# Patient Record
Sex: FEMALE | Race: WHITE | NOT HISPANIC OR LATINO | Employment: UNEMPLOYED | URBAN - NONMETROPOLITAN AREA
[De-identification: names, ages, dates, MRNs, and addresses within clinical notes are randomized per-mention and may not be internally consistent; named-entity substitution may affect disease eponyms.]

---
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
## 2024-02-22 DIAGNOSIS — Z30.8 ENCOUNTER FOR OTHER CONTRACEPTIVE MANAGEMENT: ICD-10-CM

## 2024-02-22 RX ORDER — NORETHINDRONE ACETATE AND ETHINYL ESTRADIOL, ETHINYL ESTRADIOL AND FERROUS FUMARATE 1MG-10(24)
1 KIT ORAL DAILY
Qty: 84 TABLET | Refills: 3 | Status: SHIPPED | OUTPATIENT
Start: 2024-02-22

## 2024-05-19 DIAGNOSIS — Z30.8 ENCOUNTER FOR OTHER CONTRACEPTIVE MANAGEMENT: ICD-10-CM

## 2024-05-20 RX ORDER — NORETHINDRONE ACETATE AND ETHINYL ESTRADIOL, ETHINYL ESTRADIOL AND FERROUS FUMARATE 1MG-10(24)
1 KIT ORAL DAILY
Qty: 84 TABLET | Refills: 3 | Status: SHIPPED | OUTPATIENT
Start: 2024-05-20

## 2024-07-09 DIAGNOSIS — Z30.8 ENCOUNTER FOR OTHER CONTRACEPTIVE MANAGEMENT: ICD-10-CM

## 2024-07-09 RX ORDER — NORETHINDRONE ACETATE AND ETHINYL ESTRADIOL, ETHINYL ESTRADIOL AND FERROUS FUMARATE 1MG-10(24)
1 KIT ORAL DAILY
Qty: 84 TABLET | Refills: 3 | OUTPATIENT
Start: 2024-07-09

## 2024-08-08 DIAGNOSIS — Z30.8 ENCOUNTER FOR OTHER CONTRACEPTIVE MANAGEMENT: ICD-10-CM

## 2024-08-09 RX ORDER — NORETHINDRONE ACETATE AND ETHINYL ESTRADIOL, ETHINYL ESTRADIOL AND FERROUS FUMARATE 1MG-10(24)
1 KIT ORAL DAILY
Qty: 84 TABLET | Refills: 0 | Status: SHIPPED | OUTPATIENT
Start: 2024-08-09

## 2024-10-30 DIAGNOSIS — Z30.8 ENCOUNTER FOR OTHER CONTRACEPTIVE MANAGEMENT: ICD-10-CM

## 2024-11-01 RX ORDER — NORETHINDRONE ACETATE AND ETHINYL ESTRADIOL, ETHINYL ESTRADIOL AND FERROUS FUMARATE 1MG-10(24)
1 KIT ORAL DAILY
Qty: 30 TABLET | Refills: 0 | Status: SHIPPED | OUTPATIENT
Start: 2024-11-01

## 2024-11-22 ENCOUNTER — OFFICE VISIT (OUTPATIENT)
Dept: OBSTETRICS AND GYNECOLOGY | Age: 19
End: 2024-11-22
Payer: COMMERCIAL

## 2024-11-22 VITALS
DIASTOLIC BLOOD PRESSURE: 72 MMHG | HEIGHT: 61 IN | BODY MASS INDEX: 22.09 KG/M2 | WEIGHT: 117 LBS | SYSTOLIC BLOOD PRESSURE: 121 MMHG

## 2024-11-22 DIAGNOSIS — Z30.8 ENCOUNTER FOR OTHER CONTRACEPTIVE MANAGEMENT: ICD-10-CM

## 2024-11-22 RX ORDER — ONDANSETRON 8 MG/1
TABLET, ORALLY DISINTEGRATING ORAL EVERY 24 HOURS
COMMUNITY

## 2024-11-22 RX ORDER — FLUTICASONE PROPIONATE 50 MCG
SPRAY, SUSPENSION (ML) NASAL EVERY 24 HOURS
COMMUNITY
Start: 2024-11-22

## 2024-11-22 RX ORDER — LORATADINE PSEUDOEPHEDRINE SULFATE 10; 240 MG/1; MG/1
TABLET, EXTENDED RELEASE ORAL EVERY 24 HOURS
COMMUNITY
Start: 2024-11-22

## 2024-11-22 RX ORDER — BUSPIRONE HYDROCHLORIDE 5 MG/1
TABLET ORAL EVERY 12 HOURS SCHEDULED
COMMUNITY

## 2024-11-22 RX ORDER — NORETHINDRONE ACETATE AND ETHINYL ESTRADIOL, ETHINYL ESTRADIOL AND FERROUS FUMARATE 1MG-10(24)
1 KIT ORAL DAILY
Qty: 30 TABLET | Refills: 11 | Status: SHIPPED | OUTPATIENT
Start: 2024-11-22

## 2024-11-22 NOTE — PROGRESS NOTES
"Chief Complaint   Patient presents with    Gynecologic Exam     Patient is here for annual well GYN Exam for refills on OCP.  Patient is currently on Loestrin and states its going good does c/o breakthrough bleeding every other month. Patient denies current pelvic pain, abnormal vaginal bleeding or discharge, dyspareunia, urinary incontinence, and voices no other complaints.        History:  Ana Padgett is a 19 y.o. female who presents today for follow-up for evaluation of the above:    HPI    Patient presents today for f/u OCP   Doing well at this time         ROS:  Review of Systems   Constitutional: Negative.    HENT: Negative.     Eyes: Negative.    Respiratory: Negative.     Cardiovascular: Negative.    Gastrointestinal: Negative.    Endocrine: Negative.    Genitourinary: Negative.    Musculoskeletal: Negative.    Skin: Negative.    Neurological: Negative.    Psychiatric/Behavioral: Negative.         Ms. Padgett  reports that she has never smoked. She has never used smokeless tobacco. She reports that she does not drink alcohol and does not use drugs.      Current Outpatient Medications:     busPIRone (BUSPAR) 5 MG tablet, Every 12 (Twelve) Hours., Disp: , Rfl:     fluticasone (FLONASE) 50 MCG/ACT nasal spray, Daily., Disp: , Rfl:     loratadine-pseudoephedrine (Claritin-D 24 Hour)  MG per 24 hr tablet, Daily., Disp: , Rfl:     Norethin-Eth Estrad-Fe Biphas (Lo Loestrin Fe) 1 MG-10 MCG / 10 MCG tablet, Take 1 tablet by mouth Daily., Disp: 30 tablet, Rfl: 11    ondansetron ODT (ZOFRAN-ODT) 8 MG disintegrating tablet, Daily., Disp: , Rfl:       OBJECTIVE:  /72   Ht 154.9 cm (61\")   Wt 53.1 kg (117 lb)   LMP 10/28/2024 (Approximate)   BMI 22.11 kg/m²    Physical Exam  Constitutional:       Appearance: She is not ill-appearing.   Pulmonary:      Effort: No respiratory distress.   Neurological:      Mental Status: She is oriented to person, place, and time.   Psychiatric:         Behavior: Behavior " normal.         Assessment/Plan    Diagnoses and all orders for this visit:    1. Encounter for other contraceptive management  -     Norethin-Eth Estrad-Fe Biphas (Lo Loestrin Fe) 1 MG-10 MCG / 10 MCG tablet; Take 1 tablet by mouth Daily.  Dispense: 30 tablet; Refill: 11         An After Visit Summary was printed and given to the patient at discharge.  Return in about 1 year (around 11/22/2025), or if symptoms worsen or fail to improve. Sooner if problems arise.          Catherine MANJARREZ. 11/25/2024   Electronically Signed

## 2024-12-05 ENCOUNTER — TELEPHONE (OUTPATIENT)
Dept: CARDIOLOGY | Facility: CLINIC | Age: 19
End: 2024-12-05
Payer: COMMERCIAL

## 2024-12-05 NOTE — TELEPHONE ENCOUNTER
Patient is contacted about her request to see Dr Doan for syncopal episodes. She is instructed to have her PCP order an echocardiogram and send a referral to us. After Dr Doan has seen the echocardiogram the patient will be scheduled for an appointment with him. She voiced understanding.Sangeetha Machado MA

## 2025-07-03 ENCOUNTER — OFFICE VISIT (OUTPATIENT)
Dept: CARDIOLOGY | Facility: CLINIC | Age: 20
End: 2025-07-03
Payer: COMMERCIAL

## 2025-07-03 VITALS
OXYGEN SATURATION: 100 % | HEIGHT: 61 IN | BODY MASS INDEX: 22.47 KG/M2 | SYSTOLIC BLOOD PRESSURE: 110 MMHG | WEIGHT: 119 LBS | HEART RATE: 83 BPM | DIASTOLIC BLOOD PRESSURE: 63 MMHG

## 2025-07-03 DIAGNOSIS — I49.1 ATRIAL PREMATURE COMPLEX: Primary | ICD-10-CM

## 2025-07-03 DIAGNOSIS — R55 VASOVAGAL SYNDROME: ICD-10-CM

## 2025-07-03 DIAGNOSIS — R55 SYNCOPE AND COLLAPSE: ICD-10-CM

## 2025-07-03 PROCEDURE — 93000 ELECTROCARDIOGRAM COMPLETE: CPT | Performed by: INTERNAL MEDICINE

## 2025-07-03 PROCEDURE — 99204 OFFICE O/P NEW MOD 45 MIN: CPT | Performed by: INTERNAL MEDICINE

## 2025-07-03 NOTE — PROGRESS NOTES
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 Christine Nick for evaluation of the above.  She is also been seen and thoroughly evaluated by cardiology in [place], Dr. Salmeron, who ordered an echocardiogram, stress test, and tilt table.  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  The results of her testing is not available to me today except for an echocardiogram performed in May fill which reports of structurally normal heart.      The patient is a 20-year-old female who presents for a new patient evaluation. She is accompanied by her boyfriend, Henry Atwood.    She experienced an episode of syncope in the fall of 2024 while standing in class, preceded by nausea and lightheadedness. She lost consciousness and was awakened by her classmate. This was her first such incident. She reports no unusual activities or dietary changes prior to the episode. She had returned from a trip to [place] the previous day and consumed water during the journey. On the morning of the incident, she had a protein yogurt, granola, and a glass of water for breakfast. She also reports feeling faint when having blood drawn.     She occasionally experiences heart flutters, but not frequently enough to cause concern. She was taken to the hospital by her professor where they elevated her legs, which helped her feel better. She has not undergone a stress test or tilt table test. She was prescribed Claritin-D as needed following a bout of pneumonia and COVID-19 around Thanksgiving 2024, but she is not currently taking it. She was fitted with a Holter monitor in 10/2024 and subsequently consulted a cardiologist who performed an EKG. The only other time she passed out was in high school when she  squished her finger and experienced significant pain.    SOCIAL HISTORY  Education Level: Currently studying business administration  Diet: The patient consumes protein yogurt, granola, and water for breakfast.      Review of Systems   Constitutional: Negative for malaise/fatigue.   Cardiovascular:  Negative for chest pain, claudication, dyspnea on exertion, leg swelling, near-syncope, orthopnea, palpitations, paroxysmal nocturnal dyspnea and syncope.   Respiratory:  Negative for shortness of breath.    Hematologic/Lymphatic: Does not bruise/bleed easily.        Otherwise complete ROS reviewed and negative except as mentioned in the HPI.      Past Medical History:   Past Medical History:   Diagnosis Date    Abnormal ECG     Arrhythmia     Neoplasm of uncertain behavior     Syncope and collapse 10/2024       Past Surgical History:  Past Surgical History:   Procedure Laterality Date    ADENOIDECTOMY      TYMPANOSTOMY TUBE PLACEMENT         Family History: family history includes No Known Problems in her brother, father, maternal grandfather, maternal grandmother, mother, paternal grandfather, and paternal grandmother.    Social History:  reports that she has never smoked. She has never used smokeless tobacco. She reports that she does not drink alcohol and does not use drugs.    Medications:  Prior to Admission medications    Medication Sig Start Date End Date Taking? Authorizing Provider   busPIRone (BUSPAR) 5 MG tablet Every 12 (Twelve) Hours.    ProviderYou MD   fluticasone (FLONASE) 50 MCG/ACT nasal spray Daily. 11/22/24   You Nelson MD   loratadine-pseudoephedrine (Claritin-D 24 Hour)  MG per 24 hr tablet Daily. 11/22/24   You Nelson MD   Norethin-Eth Estrad-Fe Biphas (Lo Loestrin Fe) 1 MG-10 MCG / 10 MCG tablet Take 1 tablet by mouth Daily. 11/22/24   Catherine Schmitt APRN   ondansetron ODT (ZOFRAN-ODT) 8 MG disintegrating tablet Daily.    ProviderYou MD  "    Allergies:  Allergies   Allergen Reactions    Cephalosporins Rash       Objective     Vital Signs: /63   Pulse 83   Ht 154.9 cm (61\")   Wt 54 kg (119 lb)   SpO2 100%   BMI 22.48 kg/m²     Vitals and nursing note reviewed.   Constitutional:       General: Not in acute distress.     Appearance: Not in distress.   Neck:      Vascular: No JVD or JVR. JVD normal.   Pulmonary:      Effort: Pulmonary effort is normal.      Breath sounds: Normal breath sounds.   Cardiovascular:      Normal rate. Regular rhythm.      Murmurs: There is no murmur.      No gallop.  No rub.   Pulses:     Intact distal pulses.   Edema:     Peripheral edema absent.   Skin:     General: Skin is warm and dry.   Neurological:      Mental Status: Alert, oriented to person, place, and time and oriented to person, place and time.       Physical Exam      Results Reviewed:  Results  The following results are independently reviewed and interpreted by myself.   - Heart Monitor: 10/2024, Very frequent PACs with a 25% burden but no supraventricular rhythms. No PVCs, no ventricular arrhythmias, no pauses, and no other abnormalities    Report reviewed:    - Echocardiogram: Normal size and function of both ventricles and no valve abnormalities      ECG 12 Lead    Date/Time: 7/3/2025 9:04 AM  Performed by: Jad Doan MD    Authorized by: Jad Doan MD  Comparison: not compared with previous ECG   Previous ECG: no previous ECG available  Rhythm: sinus rhythm  BPM: 83  Conduction comments: Short RI (106ms)  Clinical impression comment: Otherwise normal ECG            Assessment / Plan        Problem List Items Addressed This Visit          Cardiac and Vasculature    Atrial premature complex - Primary       Symptoms and Signs    Syncope and collapse     Other Visit Diagnoses         Vasovagal syndrome                Assessment & Plan  1. Vasovagal syncope: Patient presents today after an isolated syncopal event last fall.  Her symptomatic " description was quite characteristic for this, she has had other similar lightheaded/near syncopal events in the past with other commonly described vasovagal triggers.  On the morning in question, she became nauseated for unclear reasons, immediately followed by severe lightheadedness signifying a drop in blood pressure, and loss of consciousness.  Her workup thus far, most importantly, includes an echocardiogram showing a structurally normal heart.  This makes the likelihood of a cardiogenic source for syncope exceedingly low.  Further workup performed, which I independently reviewed the raw data of today's visit, included heart monitor worn last October the does show frequent PACs 25% burden, but no arrhythmias including supraventricular a or ventricular.  There was no evidence of heart block or anything else that would result in syncope.  - Spent a great deal of time discussing the underlying physiology and pathophysiology involved in vasovagal syncope  - Advised to maintain adequate hydration, avoid potential triggers such as dehydration, stress, and sleep deprivation.  - In the event of feeling lightheaded, immediately lower head between legs or elevate legs if already seated to restore blood flow to the head.  - No follow-up needed unless symptoms recur.      2. Frequent Premature atrial contractions (PACs):  Holter monitor data showed a 25% burden of PACs.  I reassured the patient that these were not dangerous, practically in the setting of a structurally normal heart.   - We discussed numerous, triggers, many of which are particularly prevalent in college students.  Encouraged, as much as possible, to avoid triggers such as caffeine, stress, sleep deprivation, and dehydration     The patient was informed that no further cardiac testing is required at this time due to the normal echocardiogram and the absence of concerning symptoms. She was reassured about the non-dangerous nature of her condition and advised  on immediate actions to take if symptoms recur.  No scheduled follow-up required at this time.      Transcribed from ambient dictation for Jad Doan MD by Jad Doan MD.  07/03/25   07:54 CDT    Patient or patient representative verbalized consent to the visit recording.

## 2025-08-11 DIAGNOSIS — Z30.8 ENCOUNTER FOR OTHER CONTRACEPTIVE MANAGEMENT: ICD-10-CM

## 2025-08-11 RX ORDER — NORETHINDRONE ACETATE AND ETHINYL ESTRADIOL, ETHINYL ESTRADIOL AND FERROUS FUMARATE 1MG-10(24)
1 KIT ORAL DAILY
Qty: 84 TABLET | OUTPATIENT
Start: 2025-08-11

## 2025-08-14 DIAGNOSIS — Z30.8 ENCOUNTER FOR OTHER CONTRACEPTIVE MANAGEMENT: ICD-10-CM

## 2025-08-14 RX ORDER — NORETHINDRONE ACETATE AND ETHINYL ESTRADIOL, ETHINYL ESTRADIOL AND FERROUS FUMARATE 1MG-10(24)
1 KIT ORAL DAILY
Qty: 30 TABLET | Refills: 11 | OUTPATIENT
Start: 2025-08-14